# Patient Record
Sex: FEMALE | Race: WHITE | NOT HISPANIC OR LATINO | URBAN - METROPOLITAN AREA
[De-identification: names, ages, dates, MRNs, and addresses within clinical notes are randomized per-mention and may not be internally consistent; named-entity substitution may affect disease eponyms.]

---

## 2019-02-07 ENCOUNTER — TELEPHONE (OUTPATIENT)
Dept: GASTROENTEROLOGY | Facility: CLINIC | Age: 79
End: 2019-02-07

## 2019-02-07 NOTE — TELEPHONE ENCOUNTER
Pt called requesting results of her colonoscopy from 1/7  After reviewing chart noted to her that Dr Kanu Stephen left a message for her on 1/15  Reviewed his recommendations and discussed Protonix  She states did not remember getting that information and requested this be mailed to her    Mailed

## 2019-02-25 ENCOUNTER — TELEPHONE (OUTPATIENT)
Dept: GASTROENTEROLOGY | Facility: CLINIC | Age: 79
End: 2019-02-25

## 2019-02-25 NOTE — TELEPHONE ENCOUNTER
Pt's daughter Slade  left [facility] stating her Mom had an endoscopy a mo or so ago; she never got Dx or answer to problems; asks for CB to [phone]

## 2019-02-27 NOTE — TELEPHONE ENCOUNTER
Returned call, no answer, left message to call back  KK left message on her phone 1/15/19 and [id number] BEDOYA spoke to her on 2/7/19 reviewed [place] information and mailed the path reports to patient (she already had the procedure report mentioning PPI in her folder)

## 2019-02-28 NOTE — TELEPHONE ENCOUNTER
Called pt and confirmed with her that she did not have any questions or concerns at this time  Did authorize ok to speak with Yamil about her care  Called Yamil back and left another message that we have spoken to the pt now three times and she has a follow up apt 3/6 to discuss in more detail  Did note to her that if she has any questions would be glad to review with her before the ov